# Patient Record
Sex: FEMALE | Race: OTHER | URBAN - METROPOLITAN AREA
[De-identification: names, ages, dates, MRNs, and addresses within clinical notes are randomized per-mention and may not be internally consistent; named-entity substitution may affect disease eponyms.]

---

## 2017-11-01 ENCOUNTER — OFFICE VISIT (OUTPATIENT)
Dept: OTOLARYNGOLOGY | Facility: CLINIC | Age: 52
End: 2017-11-01

## 2017-11-01 VITALS
BODY MASS INDEX: 25.59 KG/M2 | HEIGHT: 67.5 IN | HEART RATE: 73 BPM | DIASTOLIC BLOOD PRESSURE: 75 MMHG | SYSTOLIC BLOOD PRESSURE: 133 MMHG | WEIGHT: 165 LBS

## 2017-11-01 DIAGNOSIS — H91.21 SUDDEN HEARING LOSS, RIGHT: Primary | ICD-10-CM

## 2017-11-01 PROCEDURE — 99212 OFFICE O/P EST SF 10 MIN: CPT | Performed by: OTOLARYNGOLOGY

## 2017-11-01 PROCEDURE — 99204 OFFICE O/P NEW MOD 45 MIN: CPT | Performed by: OTOLARYNGOLOGY

## 2017-11-01 RX ORDER — PREDNISONE 10 MG/1
TABLET ORAL
COMMUNITY
Start: 2017-10-25 | End: 2017-11-08

## 2017-11-01 RX ORDER — VALACYCLOVIR HYDROCHLORIDE 1 G/1
TABLET, FILM COATED ORAL
COMMUNITY
Start: 2017-10-25

## 2017-11-01 RX ORDER — PREDNISONE 20 MG/1
TABLET ORAL
Qty: 15 TABLET | Refills: 0 | Status: SHIPPED | OUTPATIENT
Start: 2017-11-01

## 2017-11-01 NOTE — PROGRESS NOTES
Shaylee Diamond is a 45year old female. Patient presents with:  Hearing Loss: RT Ear Hearing Loss & Pain         HISTORY OF PRESENT ILLNESS  11/1/2017   Here for evaluation of  Right sided  hearing loss. Patient feels this started suddenly 10 days ago.   She bruising. PHYSICAL EXAM    /75   Pulse 73   Ht 5' 7.5\" (1.715 m)   Wt 165 lb (74.8 kg)   BMI 25.46 kg/m²     System Findings Details   Skin Normal Inspection - Normal. No suspicious lesions bruises or masses.    Constitutional Normal Overall appe

## 2017-11-06 ENCOUNTER — TELEPHONE (OUTPATIENT)
Dept: OTOLARYNGOLOGY | Facility: CLINIC | Age: 52
End: 2017-11-06

## 2017-11-06 NOTE — TELEPHONE ENCOUNTER
LM for pt to c/b. Pt should complete steroid and then schedule HT. Per Sam Dunn, pt's HT was authorized, [id number], valid 11/2 - 12/2.

## 2017-11-06 NOTE — TELEPHONE ENCOUNTER
Confirmed CPT codes for hearing test [phone], [id number], and [phone]). Per Reed Stein (Luis Enrique Joy), pt's HT has been authorized, [id number], valid 11/2 - 12/2.

## 2017-11-06 NOTE — TELEPHONE ENCOUNTER
RN from [facility] needs to confirm the codes sent in for prior auth for pt.  To get a hearing test.

## 2017-11-08 ENCOUNTER — OFFICE VISIT (OUTPATIENT)
Dept: OTOLARYNGOLOGY | Facility: CLINIC | Age: 52
End: 2017-11-08

## 2017-11-08 ENCOUNTER — OFFICE VISIT (OUTPATIENT)
Dept: AUDIOLOGY | Facility: CLINIC | Age: 52
End: 2017-11-08

## 2017-11-08 VITALS
SYSTOLIC BLOOD PRESSURE: 130 MMHG | BODY MASS INDEX: 25.59 KG/M2 | DIASTOLIC BLOOD PRESSURE: 75 MMHG | TEMPERATURE: 98 F | HEIGHT: 67.5 IN | WEIGHT: 165 LBS

## 2017-11-08 DIAGNOSIS — H91.21 SUDDEN HEARING LOSS, RIGHT: Primary | ICD-10-CM

## 2017-11-08 DIAGNOSIS — H91.8X1 OTHER SPECIFIED HEARING LOSS, RIGHT EAR: Primary | ICD-10-CM

## 2017-11-08 PROCEDURE — 99213 OFFICE O/P EST LOW 20 MIN: CPT | Performed by: OTOLARYNGOLOGY

## 2017-11-08 PROCEDURE — 92557 COMPREHENSIVE HEARING TEST: CPT | Performed by: AUDIOLOGIST

## 2017-11-08 PROCEDURE — 99212 OFFICE O/P EST SF 10 MIN: CPT | Performed by: OTOLARYNGOLOGY

## 2017-11-08 PROCEDURE — 92567 TYMPANOMETRY: CPT | Performed by: AUDIOLOGIST

## 2017-11-08 NOTE — PROGRESS NOTES
Elina Dominguez is a 45year old female. Patient presents with: Follow - Up: regarding sudden hearing loss of right ear, improvement in symptoms         HISTORY OF PRESENT ILLNESS  11/1/2017   Here for evaluation of  Right sided  hearing loss.  Patient feels heartbeat   GI Negative Abdominal pain and diarrhea. Endocrine Negative Cold intolerance and heat intolerance. Neuro Negative Tremors. Psych Negative Behavioral issues   Integumentary Negative Frequent skin infections, pigment change and rash.    Hema

## 2017-11-08 NOTE — PROGRESS NOTES
AUDIOGRAM     Rafat Garcia was referred for testing by Bozena Vogt  For follow-up of sudden right sided hearing loss   1/22/1965  [id number]    Otoscopic inspection: right ear no cerumen; left ear no cerumen.        Tests/Procedures  Patient was marco a